# Patient Record
Sex: FEMALE | URBAN - METROPOLITAN AREA
[De-identification: names, ages, dates, MRNs, and addresses within clinical notes are randomized per-mention and may not be internally consistent; named-entity substitution may affect disease eponyms.]

---

## 2022-08-01 ENCOUNTER — APPOINTMENT (OUTPATIENT)
Dept: URBAN - METROPOLITAN AREA SURGERY 9 | Age: 52
Setting detail: DERMATOLOGY
End: 2022-08-02

## 2022-08-01 DIAGNOSIS — L56.8 OTHER SPECIFIED ACUTE SKIN CHANGES DUE TO ULTRAVIOLET RADIATION: ICD-10-CM

## 2022-08-01 DIAGNOSIS — R21 RASH AND OTHER NONSPECIFIC SKIN ERUPTION: ICD-10-CM

## 2022-08-01 PROCEDURE — OTHER OTHER: OTHER

## 2022-08-01 PROCEDURE — OTHER PRESCRIPTION: OTHER

## 2022-08-01 PROCEDURE — 99213 OFFICE O/P EST LOW 20 MIN: CPT

## 2022-08-01 PROCEDURE — OTHER COUNSELING: OTHER

## 2022-08-01 RX ORDER — VALACYCLOVIR HYDROCHLORIDE 1 G/1
TABLET, FILM COATED ORAL
Qty: 15 | Refills: 0 | Status: ERX | COMMUNITY
Start: 2022-08-01

## 2022-08-01 NOTE — PROCEDURE: OTHER
Other (Free Text): consider CO2 laser; also discussed topical 5-FU as option, and pt wishes to proceed with laser. Would bill this under CPT 97773 and patient has checked with her insurance and indicates that this is a covered procedure. Other (Free Text): consider CO2 laser; also discussed topical 5-FU as option, and pt wishes to proceed with laser. Would bill this under CPT 65285 and patient has checked with her insurance and indicates that this is a covered procedure.

## 2022-08-01 NOTE — PROCEDURE: OTHER
Other (Free Text): Has undergone patch testing with Dr. Gaffney which showed mild reaction to fragrance, but no other reactions.\\n\\npt has failed treatment with topical steroids, antifungals, antibiotics, compounded creams; also has tried oral antifungals and experienced no relief.\\n\\nHas not tried antiviral treatment.

## 2022-08-24 ENCOUNTER — RX ONLY (RX ONLY)
Age: 52
End: 2022-08-24

## 2022-08-24 RX ORDER — VALACYCLOVIR HYDROCHLORIDE 500 MG/1
TABLET, FILM COATED ORAL
Qty: 10 | Refills: 0 | Status: ERX | COMMUNITY
Start: 2022-08-24

## 2022-08-29 ENCOUNTER — APPOINTMENT (OUTPATIENT)
Dept: URBAN - METROPOLITAN AREA SURGERY 9 | Age: 52
Setting detail: DERMATOLOGY
End: 2022-09-29

## 2022-08-29 DIAGNOSIS — L56.8 OTHER SPECIFIED ACUTE SKIN CHANGES DUE TO ULTRAVIOLET RADIATION: ICD-10-CM

## 2022-08-29 PROCEDURE — OTHER LASER RESURFACING: OTHER

## 2022-08-29 PROCEDURE — OTHER OTHER: OTHER

## 2022-08-29 PROCEDURE — 17999 UNLISTD PX SKN MUC MEMB SUBQ: CPT

## 2022-08-29 ASSESSMENT — LOCATION SIMPLE DESCRIPTION DERM: LOCATION SIMPLE: RIGHT LIP

## 2022-08-29 ASSESSMENT — LOCATION DETAILED DESCRIPTION DERM: LOCATION DETAILED: RIGHT SUPERIOR VERMILION LIP

## 2022-08-29 ASSESSMENT — LOCATION ZONE DERM: LOCATION ZONE: LIP

## 2022-08-29 NOTE — PROCEDURE: LASER RESURFACING
Number Of Passes: 1
Energy(Mj): 5
Percent Coverage Per Pass (%): 0
Corneal Shield Text: A corneal shield was inserted after appropriate application of topical anesthesia.
Laser Type: Fraxel Re:pair
Post-Care Instructions: I reviewed with the patient in detail post-care instructions. Patient should avoid sun until area fully healed. Pt should apply vaseline to treated areas, and remove crusts gently with water-vinegar soaks.
Detail Level: Detailed
Wavelength: 10,600nm
Power (Qiu): 40
Anesthesia Type: 1% lidocaine with epinephrine
Consent: Written consent obtained, risks reviewed including but not limited to crusting, scabbing, blistering, scarring, darker or lighter pigmentary change, incomplete improvement of dyschromia, wrinkles, prolonged erythema and facial swelling, infection and bleeding.
Was A Corneal Shield Used?: No

## 2022-09-29 ENCOUNTER — APPOINTMENT (OUTPATIENT)
Dept: URBAN - METROPOLITAN AREA SURGERY 9 | Age: 52
Setting detail: DERMATOLOGY
End: 2022-10-04

## 2022-09-29 DIAGNOSIS — L24.4 IRRITANT CONTACT DERMATITIS DUE TO DRUGS IN CONTACT WITH SKIN: ICD-10-CM

## 2022-09-29 DIAGNOSIS — L56.8 OTHER SPECIFIED ACUTE SKIN CHANGES DUE TO ULTRAVIOLET RADIATION: ICD-10-CM

## 2022-09-29 PROCEDURE — 99213 OFFICE O/P EST LOW 20 MIN: CPT

## 2022-09-29 PROCEDURE — OTHER PRESCRIPTION: OTHER

## 2022-09-29 PROCEDURE — OTHER OTHER: OTHER

## 2022-09-29 RX ORDER — TRIAMCINOLONE ACETONIDE 0.5 MG/G
OINTMENT TOPICAL
Qty: 1 | Refills: 0 | Status: ERX | COMMUNITY
Start: 2022-09-29

## 2022-09-29 ASSESSMENT — LOCATION ZONE DERM: LOCATION ZONE: LIP

## 2022-09-29 ASSESSMENT — LOCATION SIMPLE DESCRIPTION DERM: LOCATION SIMPLE: LEFT UPPER LIP

## 2022-09-29 ASSESSMENT — LOCATION DETAILED DESCRIPTION DERM: LOCATION DETAILED: LEFT SUPERIOR VERMILION BORDER

## 2022-09-29 NOTE — PROCEDURE: OTHER
Detail Level: Detailed
Other (Free Text): pt still with erythema, inflammation, and mild discomfort of lips. Difficult to assess what is related to irritation/inflammation vs residual actinic cheilitis vs healing post laser. Start topical triamcinolone to settle inflammation and reassess in 1 month
Note Text (......Xxx Chief Complaint.): This diagnosis correlates with the
Render Risk Assessment In Note?: no

## 2022-11-08 ENCOUNTER — APPOINTMENT (OUTPATIENT)
Dept: URBAN - METROPOLITAN AREA SURGERY 9 | Age: 52
Setting detail: DERMATOLOGY
End: 2022-11-09

## 2022-11-08 DIAGNOSIS — L56.8 OTHER SPECIFIED ACUTE SKIN CHANGES DUE TO ULTRAVIOLET RADIATION: ICD-10-CM

## 2022-11-08 DIAGNOSIS — L24.4 IRRITANT CONTACT DERMATITIS DUE TO DRUGS IN CONTACT WITH SKIN: ICD-10-CM

## 2022-11-08 PROCEDURE — OTHER PRESCRIPTION: OTHER

## 2022-11-08 PROCEDURE — 99213 OFFICE O/P EST LOW 20 MIN: CPT

## 2022-11-08 PROCEDURE — OTHER OTHER: OTHER

## 2022-11-08 RX ORDER — TRIAMCINOLONE ACETONIDE 0.5 MG/G
OINTMENT TOPICAL
Qty: 1 | Refills: 0 | Status: CANCELLED

## 2022-11-08 RX ORDER — TRIAMCINOLONE ACETONIDE 1 MG/G
OINTMENT TOPICAL
Qty: 30 | Refills: 1 | Status: ERX | COMMUNITY
Start: 2022-11-08

## 2022-11-08 NOTE — PROCEDURE: OTHER
Detail Level: Detailed
Other (Free Text): Change use of Triamcinolone cream to prn itching and irritation
Note Text (......Xxx Chief Complaint.): This diagnosis correlates with the
Render Risk Assessment In Note?: no
Other (Free Text): Use Aquaphor or Vaseline prn and Aquaphor and sunscreen when outdoors

## 2023-12-04 ENCOUNTER — APPOINTMENT (OUTPATIENT)
Dept: URBAN - METROPOLITAN AREA SURGERY 9 | Age: 53
Setting detail: DERMATOLOGY
End: 2023-12-04

## 2023-12-04 DIAGNOSIS — L56.8 OTHER SPECIFIED ACUTE SKIN CHANGES DUE TO ULTRAVIOLET RADIATION: ICD-10-CM

## 2023-12-04 DIAGNOSIS — Z41.9 ENCOUNTER FOR PROCEDURE FOR PURPOSES OTHER THAN REMEDYING HEALTH STATE, UNSPECIFIED: ICD-10-CM

## 2023-12-04 PROCEDURE — OTHER PRESCRIPTION: OTHER

## 2023-12-04 PROCEDURE — OTHER LASER RESURFACING: OTHER

## 2023-12-04 PROCEDURE — 17999 UNLISTD PX SKN MUC MEMB SUBQ: CPT

## 2023-12-04 PROCEDURE — OTHER OTHER: OTHER

## 2023-12-04 RX ORDER — VALACYCLOVIR HYDROCHLORIDE 500 MG/1
TABLET, FILM COATED ORAL
Qty: 10 | Refills: 1 | Status: ERX | COMMUNITY
Start: 2023-12-04

## 2023-12-04 ASSESSMENT — LOCATION SIMPLE DESCRIPTION DERM
LOCATION SIMPLE: RIGHT LIP
LOCATION SIMPLE: UPPER LIP

## 2023-12-04 ASSESSMENT — LOCATION DETAILED DESCRIPTION DERM
LOCATION DETAILED: RIGHT SUPERIOR VERMILION LIP
LOCATION DETAILED: PHILTRUM

## 2023-12-04 ASSESSMENT — LOCATION ZONE DERM: LOCATION ZONE: LIP

## 2023-12-04 NOTE — PROCEDURE: OTHER
Detail Level: Zone
Render Risk Assessment In Note?: no
Note Text (......Xxx Chief Complaint.): This diagnosis correlates with the
Other (Free Text): Procedure code 85623

## 2023-12-04 NOTE — PROCEDURE: LASER RESURFACING
Percent Coverage Per Pass (%): 0
Anesthesia Volume In Cc: 1
Attending Attestation (For Attendings USE Only)...
Consent: Written consent obtained, risks reviewed including but not limited to crusting, scabbing, blistering, scarring, darker or lighter pigmentary change, incomplete improvement of dyschromia, wrinkles, prolonged erythema and facial swelling, infection and bleeding.
Detail Level: Zone
Pulse Duration (Usec): 1400
External Cooling Fan Speed: 5
Power (Qiu): 25
Anesthesia Type: 1% lidocaine with epinephrine and a 1:10 solution of 8.4% sodium bicarbonate
Wavelength: 10,600nm
Was A Corneal Shield Used?: No
Price (Use Numbers Only, No Special Characters Or $): 0.0
Corneal Shield Text: A corneal shield was inserted after appropriate application of topical anesthesia.
Post-Care Instructions: I reviewed with the patient in detail post-care instructions. Patient should avoid sun until area fully healed. Pt should apply vaseline to treated areas, and remove crusts gently with water-vinegar soaks. Written instructions and all post-care products provided to patient. Dr. Davila's cell phone number given to patient for use as needed.
Laser Type: CO2 laser
Laser Type: Fraxel Re:pair
Post-Care Instructions: I reviewed with the patient in detail post-care instructions. Patient should avoid sun until area fully healed. Pt should apply vaseline to treated areas, and remove crusts gently with water-vinegar soaks.
Detail Level: Detailed
Power (Qiu): 40
Anesthesia Type: 1% lidocaine with epinephrine

## 2024-02-05 ENCOUNTER — APPOINTMENT (OUTPATIENT)
Dept: URBAN - METROPOLITAN AREA SURGERY 9 | Age: 54
Setting detail: DERMATOLOGY
End: 2024-02-05

## 2024-05-10 ENCOUNTER — APPOINTMENT (OUTPATIENT)
Dept: URBAN - METROPOLITAN AREA SURGERY 9 | Age: 54
Setting detail: DERMATOLOGY
End: 2024-05-12

## 2024-05-10 DIAGNOSIS — L71.0 PERIORAL DERMATITIS: ICD-10-CM

## 2024-05-10 PROCEDURE — OTHER COUNSELING: OTHER

## 2024-05-10 PROCEDURE — OTHER TREATMENT REGIMEN: OTHER

## 2024-05-10 PROCEDURE — OTHER PRESCRIPTION: OTHER

## 2024-05-10 PROCEDURE — 99213 OFFICE O/P EST LOW 20 MIN: CPT

## 2024-05-10 RX ORDER — DOXYCYCLINE HYCLATE 100 MG/1
CAPSULE, GELATIN COATED ORAL
Qty: 28 | Refills: 1 | Status: ERX | COMMUNITY
Start: 2024-05-10

## 2024-05-10 RX ORDER — PIMECROLIMUS 10 MG/G
CREAM TOPICAL BID
Qty: 30 | Refills: 1 | Status: ERX | COMMUNITY
Start: 2024-05-10

## 2024-05-10 ASSESSMENT — LOCATION DETAILED DESCRIPTION DERM
LOCATION DETAILED: LEFT INFERIOR VERMILION BORDER
LOCATION DETAILED: PHILTRUM

## 2024-05-10 ASSESSMENT — LOCATION ZONE DERM: LOCATION ZONE: LIP

## 2024-05-10 ASSESSMENT — LOCATION SIMPLE DESCRIPTION DERM
LOCATION SIMPLE: UPPER LIP
LOCATION SIMPLE: LEFT LOWER LIP

## 2024-05-10 NOTE — PROCEDURE: TREATMENT REGIMEN
Continue Regimen: Vaseline\\nCerave PM moisturizer
Discontinue Regimen: current toothpaste\\nCerave AM moisturizer (chemical sunscreen ingredients and parabens)
Detail Level: Simple
Initiate Treatment: Bright Girl sheer mineral sunscreen (zinc oxide only active)\\n[employer] [place] toothpaste

## 2024-05-13 RX ORDER — PIMECROLIMUS 10 MG/G
CREAM TOPICAL BID
Qty: 30 | Refills: 1 | Status: ERX